# Patient Record
(demographics unavailable — no encounter records)

---

## 2025-06-26 NOTE — HISTORY OF PRESENT ILLNESS
[FreeTextEntry1] : Presents in [place] office for right heel pain. states it began about 1 month ago, but has hx of heel pain in the past but comes and goes. was told in the past by PCP that she has plantar fasciitis, and was recommended to massage area. She sees a new PCP and was referred here states pain is strong, is a stabbing pain. Has most pain after activities. Sometimes has pain first step of the morning.  pain level 8 out of 10

## 2025-06-26 NOTE — ASSESSMENT
[FreeTextEntry1] : Discussed diagnosis and treatment with patient Discussed etiology of symptoms patient is experiencing Demonstrated proper stretching techniques with patient showing understanding Discussed proper RICE techniques to reduce inflammation and for pain control Discussed specific examples of over-the-counter inserts to control heel eversion and support the medial arch Discussed proper shoes (stiff heel counter and midsole with flexion at the 1st MTPJ) Patient kindly declines oral NSAIDs Discussed all risks, complications, and benefits of corticosteroid injection therapy. Will reassess upon next evaluation Patient to return to the office in 3-4 weeks

## 2025-06-26 NOTE — PHYSICAL EXAM
[General Appearance - Alert] : alert [General Appearance - In No Acute Distress] : in no acute distress [de-identified] : Pain on palpation at medial calcaneal tuberosity of Right. Increased pain with the windless mechanism.  Flexible mid/hindfoot deformity noted foot passively corrected to plantigrade foot position Right Left.  Decreased range of motion in dorsiflexion Right Left. Standing Exam: Decrease in medial longitudinal arch Right and Left.  [] : no rash [Skin Lesions] : no skin lesions [Sensation] : the sensory exam was normal to light touch and pinprick [No Focal Deficits] : no focal deficits [Deep Tendon Reflexes (DTR)] : deep tendon reflexes were 2+ and symmetric [Motor Exam] : the motor exam was normal [Oriented To Time, Place, And Person] : oriented to person, place, and time [Impaired Insight] : insight and judgment were intact [Affect] : the affect was normal